# Patient Record
(demographics unavailable — no encounter records)

---

## 2025-07-28 NOTE — PHYSICAL EXAM
[FreeTextEntry1] : MMSE 24/29 (unable to see diagram to copy). .Speech is loud at times. Language is largely intact. Content can bit accusatory and otherwise circumferential. CN grossly intact. Motor exam intact strength in UE's. Refuses exam in lower extremities. Reflexes diffusely reduced. Patient refuses remainder of exam.

## 2025-07-28 NOTE — HISTORY OF PRESENT ILLNESS
[FreeTextEntry1] : The patient is a 90 year old female with a PMH of alcohol use disorder presenting for evaluation of cognitive decline. She is accompanied by her daughter, Lianna.  The patient's daughter reports she first noticed short-term memory issues about 10 years ago. She lived in [facility] so mostly noticed this on their phone calls or her infrequent visits.    Over the last few years, the daughter has been more involved due to recurrent falls/injuries requiring hospitalizations and rehab stays. All falls have been in the setting of alcohol use. After her first fall in 2/2024, the patient was able to return home and function relatively independently, still paying her own bills, etc. However, since a second fall in 11/2024, the daughter has been in charge of finances and has also hired home health services. Her most recent fall was in 06/2025; she was also found to have UTI. She now has HH a few hours a few days a week as well as PT a few times per week. HH aides help clean her apartment but the patient states she does most of her ADLs herself. Her daughter has concern over her basic hygiene practices. For exam, she never brushes her teeth anymore. She has her meals delivered. She has a poor appetite and drinks Diet coke and white wine most days. Her daughter estimates the patient drinks about 4 bottles of wine per week.  Her daughter is worried about malnutrition contributing to her mental status/decline. In fact, she was diagnosed with thiamine deficiency at [facility] after her last fall but has been refusing to take the oral supplements. Otherwise, the patient prefers not to socialize and is often alone in her apartment. Her daughter thinks she is depressed.   Prior Workup MRI Brain 06/2025: global volume loss. WMD

## 2025-07-28 NOTE — ASSESSMENT
[FreeTextEntry1] : The patient is a 90 year old female with a PMH of alcohol use disorder presenting for evaluation of cognitive decline which I suspect is multifactorial related to age, alcohol use, depression/isolation, and possibly an underlying neurodegenerative disorder. Further eval is needed, but given patient's refusal for more simple tests, obtaining necessary eval to fully understand contributing factors to cog impairment may be difficult.  PLAN --NPT --PET brain --labs --PCP referral --Alcohol cessation (gradual)   RTC 3 months